# Patient Record
Sex: FEMALE | Race: WHITE
[De-identification: names, ages, dates, MRNs, and addresses within clinical notes are randomized per-mention and may not be internally consistent; named-entity substitution may affect disease eponyms.]

---

## 2018-04-13 ENCOUNTER — HOSPITAL ENCOUNTER (EMERGENCY)
Dept: HOSPITAL 97 - ER | Age: 33
Discharge: HOME | End: 2018-04-13
Payer: SELF-PAY

## 2018-04-13 DIAGNOSIS — F41.9: ICD-10-CM

## 2018-04-13 DIAGNOSIS — Z91.09: ICD-10-CM

## 2018-04-13 DIAGNOSIS — R55: Primary | ICD-10-CM

## 2018-04-13 LAB
BUN BLD-MCNC: 18 MG/DL (ref 6–20)
GLUCOSE SERPLBLD-MCNC: 92 MG/DL (ref 65–120)
HCT VFR BLD CALC: 42.9 % (ref 36–45)
LYMPHOCYTES # SPEC AUTO: 3.8 K/UL (ref 0.7–4.9)
MCH RBC QN AUTO: 28.2 PG (ref 27–35)
MCV RBC: 88.4 FL (ref 80–100)
PMV BLD: 8.4 FL (ref 7.6–11.3)
POTASSIUM SERPL-SCNC: 3.9 MEQ/L (ref 3.6–5)
RBC # BLD: 4.86 M/UL (ref 3.86–4.86)

## 2018-04-13 PROCEDURE — 81003 URINALYSIS AUTO W/O SCOPE: CPT

## 2018-04-13 PROCEDURE — 70450 CT HEAD/BRAIN W/O DYE: CPT

## 2018-04-13 PROCEDURE — 99284 EMERGENCY DEPT VISIT MOD MDM: CPT

## 2018-04-13 PROCEDURE — 96360 HYDRATION IV INFUSION INIT: CPT

## 2018-04-13 PROCEDURE — 36415 COLL VENOUS BLD VENIPUNCTURE: CPT

## 2018-04-13 PROCEDURE — 85025 COMPLETE CBC W/AUTO DIFF WBC: CPT

## 2018-04-13 PROCEDURE — 93005 ELECTROCARDIOGRAM TRACING: CPT

## 2018-04-13 PROCEDURE — 81025 URINE PREGNANCY TEST: CPT

## 2018-04-13 PROCEDURE — 80048 BASIC METABOLIC PNL TOTAL CA: CPT

## 2018-04-13 NOTE — EDPHYS
Physician Documentation                                                                           

 [facility]                                                                

Name: Linh Urban                                                                                

Age: 32 yrs                                                                                       

Sex: Female                                                                                       

: 1985                                                                                   

MRN: [id number]                                                                                   

Arrival Date: 2018                                                                          

Time: 15:12                                                                                       

Account#: [id number]                                                                            

Bed 30                                                                                            

Private MD:                                                                                       

ED Physician Tod Lagunas                                                                      

HPI:                                                                                              

                                                                                             

15:34 This 32 yrs old  Female presents to ER via EMS with complaints of Syncope.     jr8 

15:34 The patient has experienced syncope. Onset: The symptoms/episode began/occurred         jr8 

      acutely, today. Duration: This was a single episode, that lasted an unknown period of       

      time. Context: occurred homeless shelter , occurred while the patient was standing,         

      Just prior to the episode the patient experienced dizziness. Associated injury: The         

      patient did not suffer any apparent associated injury. Associated signs and symptoms:       

      The patient has no apparent associated signs or symptoms. Current symptoms: headache,       

      that is mild. The patient has not experienced similar symptoms in the past. The patient     

      has not recently seen a physician. Patient stated that she had been packing all day.        

      Started to feel dizzy. Had "chugged" a cold bottle of water then soon after had             

      syncopal episode .                                                                          

                                                                                                  

OB/GYN:                                                                                           

15:28 LMP 4/10/2018                                                                           rk2 

                                                                                                  

Historical:                                                                                       

- Allergies:                                                                                      

15:19 Iodine;                                                                                 iw  

- Home Meds:                                                                                      

15:19 Alprazolam Oral [Active];                                                               iw  

- PMHx:                                                                                           

15:19 Depression; Hypertension; Ovarian cyst; Anxiety;                                        iw  

- PSHx:                                                                                           

15:19 None;                                                                                   iw  

                                                                                                  

- Immunization history:: Pneumococcal vaccine is not up to date, Flu vaccine is not up            

  to date.                                                                                        

- Social history:: Smoking status: Patient/guardian denies using tobacco, the patient             

  reports quitting approximately .5 years ago.                                                    

                                                                                                  

                                                                                                  

ROS:                                                                                              

15:34 Eyes: Negative for injury, pain, redness, and discharge, ENT: Negative for injury,      jr8 

      pain, and discharge, Neck: Negative for injury, pain, and swelling, Cardiovascular:         

      Negative for chest pain, palpitations, and edema, Respiratory: Negative for shortness       

      of breath, cough, wheezing, and pleuritic chest pain, Abdomen/GI: Negative for              

      abdominal pain, vomiting, diarrhea, and constipation. Positive for nausea  Back:            

      Negative for injury and pain, MS/Extremity: Negative for injury and deformity, Skin:        

      Negative for injury, rash, and discoloration.                                               

15:34 Neuro: Positive for dizziness, loss of consciousness, syncope.                              

                                                                                                  

Exam:                                                                                             

15:34 Eyes:  Pupils equal round and reactive to light, extra-ocular motions intact.  Lids and jr8 

      lashes normal.  Conjunctiva and sclera are non-icteric and not injected.  Cornea within     

      normal limits.  Periorbital areas with no swelling, redness, or edema. ENT:  Nares          

      patent. No nasal discharge, no septal abnormalities noted.  Tympanic membranes are          

      normal and external auditory canals are clear.  Oropharynx with no redness, swelling,       

      or masses, exudates, or evidence of obstruction, uvula midline.  Mucous membranes           

      moist. Neck:  Trachea midline, no thyromegaly or masses palpated, and no cervical           

      lymphadenopathy.  Supple, full range of motion without nuchal rigidity, or vertebral        

      point tenderness.  No Meningismus. Cardiovascular:  Regular rate and rhythm with a          

      normal S1 and S2.  No gallops, murmurs, or rubs.  Normal PMI, no JVD.  No pulse             

      deficits. Respiratory:  Lungs have equal breath sounds bilaterally, clear to                

      auscultation and percussion.  No rales, rhonchi or wheezes noted.  No increased work of     

      breathing, no retractions or nasal flaring. Abdomen/GI:  Soft, non-tender, with normal      

      bowel sounds.  No distension or tympany.  No guarding or rebound.  No evidence of           

      tenderness throughout. Back:  No spinal tenderness.  No costovertebral tenderness.          

      Full range of motion. Skin:  Warm, dry with normal turgor.  Normal color with no            

      rashes, no lesions, and no evidence of cellulitis. MS/ Extremity:  Pulses equal, no         

      cyanosis.  Neurovascular intact.  Full, normal range of motion. Neuro:  Awake and           

      alert, GCS 15, oriented to person, place, time, and situation.  Cranial nerves II-XII       

      grossly intact.  Motor strength 5/5 in all extremities.  Sensory grossly intact.            

      Cerebellar exam normal.  Normal gait.                                                       

                                                                                                  

Vital Signs:                                                                                      

15:19  / 91; Pulse 86; Resp 16; Pulse Ox 97% on R/A;                                    iw  

17:17  / 66 Supine; Pulse 77;                                                           rk2 

17:17  / 77 Sitting; Pulse 74;                                                          rk2 

17:17  / 78; Pulse 76;                                                                  rk2 

18:41  / 82; Pulse 67; Resp 17; Pulse Ox 97% on R/A;                                    rk2 

                                                                                                  

MDM:                                                                                              

15:13 Patient medically screened.                                                             jr8 

17:03 Data reviewed: vital signs, nurses notes, lab test result(s), EKG, radiologic studies,  [facility] 

      CT scan, and as a result, I will discharge patient. Data interpreted: Pulse oximetry:       

      on room air is 97 %. Interpretation: normal. Counseling: I had a detailed discussion        

      with the patient and/or guardian regarding: the historical points, exam findings, and       

      any diagnostic results supporting the discharge/admit diagnosis, lab results, radiology     

      results, the need for outpatient follow up, a family practitioner, to return to the         

      emergency department if symptoms worsen or persist or if there are any questions or         

      concerns that arise at home.                                                                

                                                                                                  

                                                                                             

15:32 Order name: CBC with Diff; Complete Time: 16:29                                         [facility] 

                                                                                             

15:32 Order name: Basic Metabolic Panel; Complete Time: 17:02                                 [facility] 

                                                                                             

15:32 Order name: CT Head Brain wo Cont; Complete Time: 16:21                                 [facility] 

                                                                                             

16:19 Order name: Urine Dipstick--Ancillary (enter results); Complete Time: 18:19             [facility] 

                                                                                             

16:19 Order name: Urine Pregnancy--Ancillary (enter results); Complete Time: 18:19            [facility] 

                                                                                             

15:32 Order name: Urine Pregnancy Test (obtain specimen); Complete Time: 15:57                [facility] 

                                                                                             

15:32 Order name: Urine Dipstick-Ancillary (obtain specimen); Complete Time: 15:57            [facility] 

                                                                                             

15:32 Order name: EKG - Nurse/Tech; Complete Time: 16:04                                      [facility] 

                                                                                             

15:32 Order name: EKG; Complete Time: 15:33                                                   [facility] 

                                                                                             

16:25 Order name: Labs - recollect needed; Complete Time: 16:33                               [facility] 

                                                                                             

17:04 Order name: Orthostatics; Complete Time: 17:19                                          [facility] 

                                                                                                  

Administered Medications:                                                                         

17:35 Drug: NS 0.9% 1000 ml Route: IV; Rate: 1 bolus; Site: right femoral;                    rk2 

19:00 Follow up: Response: No adverse reaction; IV Status: Completed infusion                 rk2 

                                                                                                  

                                                                                                  

Point of Care Testing:                                                                            

15:28 88 Per EMS                                                                              rk2 

      Ranges:                                                                                     

      Critical Glucose Levels:Adult <50 mg/dl or >400 mg/dl  <40 mg/dl or >180 mg/dl       

Disposition:                                                                                      

18 17:03 Discharged to Home. Impression: Syncope and collapse.                              

- Condition is Stable.                                                                            

- Discharge Instructions: Syncope.                                                                

                                                                                                  

- Medication Reconciliation Form, Thank You Letter, Antibiotic Education, Prescription            

  Opioid Use form.                                                                                

- Follow up: Private Physician; When: 2 - 3 days; Reason: Recheck today's complaints,             

  Continuance of care, Re-evaluation by your physician.                                           

- Problem is new.                                                                                 

- Symptoms have improved.                                                                         

                                                                                                  

                                                                                                  

                                                                                                  

Addendum:                                                                                         

2018                                                                                        

     07:57 Co-signature as Attending Physician, Tod Lagunas MD I agree with the assessment and  w
a

           plan of care.                                                                          

                                                                                                  

Signatures:                                                                                       

Dispatcher [employer]                           EDJayshree Loco, RN                     RN   iw                                                   

Dwayne Cabrera PA PA   jr8                                                  

Tod Lagunas MD MD wa Kidder, Rhonda RN                      RN   rk2                                                  

Teresa Daily                            mw2                                                  

                                                                                                  

**************************************************************************************************

## 2018-04-13 NOTE — ER
Nurse's Notes                                                                                     

 [facility]                                                                

Name: Linh Urban                                                                                

Age: 32 yrs                                                                                       

Sex: Female                                                                                       

: 1985                                                                                   

MRN: [id number]                                                                                   

Arrival Date: 2018                                                                          

Time: 15:12                                                                                       

Account#: [id number]                                                                            

Bed 30                                                                                            

Private MD:                                                                                       

Diagnosis: Syncope and collapse                                                                   

                                                                                                  

Presentation:                                                                                     

                                                                                             

15:15 Presenting complaint: Patient states: was cleaning and packing up at shelter, has been  iw  

      feeling dizzy and nauseous all day, was reaching up on top bunk and had syncopal            

      episode, woke up beside the baby's playpen, pt has had previous syncopal episode due to     

      dehydration, has hx of anxiety and depression, has been out of anxiety medication.          

      Transition of care: patient was not received from another setting of care. Onset of         

      symptoms was 2018. Care prior to arrival: Glucose check: 88.                      

15:15 Method Of Arrival: EMS: [facility]                                                    iw  

15:15 Acuity: MATHEW 3                                                                           iw  

                                                                                                  

Triage Assessment:                                                                                

15:28 General: Appears in no apparent distress. Behavior is calm, cooperative. Pain:. Neuro:  rk2 

      Level of Consciousness is alert, obeys commands, Oriented to person, place, time,           

      situation. Neuro: Reports a syncopal episode. Cardiovascular: Rhythm is sinus rhythm.       

      Respiratory: Airway is patent Respiratory effort is even, unlabored, Respiratory            

      pattern is regular, symmetrical. Derm: Skin is pink, warm \T\ dry.                          

                                                                                                  

OB/GYN:                                                                                           

15:28 LMP 4/10/2018                                                                           rk2 

                                                                                                  

Historical:                                                                                       

- Allergies:                                                                                      

15:19 Iodine;                                                                                 iw  

- Home Meds:                                                                                      

15:19 Alprazolam Oral [Active];                                                               iw  

- PMHx:                                                                                           

15:19 Depression; Hypertension; Ovarian cyst; Anxiety;                                        iw  

- PSHx:                                                                                           

15:19 None;                                                                                   iw  

                                                                                                  

- Immunization history:: Pneumococcal vaccine is not up to date, Flu vaccine is not up            

  to date.                                                                                        

- Social history:: Smoking status: Patient/guardian denies using tobacco, the patient             

  reports quitting approximately .5 years ago.                                                    

                                                                                                  

                                                                                                  

Screening:                                                                                        

15:27 Abuse screen: Denies threats or abuse. Nutritional screening: No deficits noted.        rk2 

      Tuberculosis screening: No symptoms or risk factors identified. Fall Risk Fall in past      

      12 months (25 points).                                                                      

                                                                                                  

Assessment:                                                                                       

15:31 Neuro: Level of Consciousness is alert, obeys commands, Oriented to person, place,      rk2 

      time, situation. Cardiovascular: Rhythm is sinus rhythm.                                    

16:30 Reassessment: Pt. resting in room, appears to be in no obvious distress \T\ this time. No rk2 

      needs voiced.                                                                               

17:20 Reassessment: Ortho vitals completed, pt. feels dizzy while standing... provider        rk2 

      notified. NS bolus ordered before DC.                                                       

19:06 Reassessment: Pt. fluids completed... feels better after fluids. Iv removed and pt. was rk2 

      able to ambulate out on her own without difficulty.                                         

                                                                                                  

Vital Signs:                                                                                      

15:19  / 91; Pulse 86; Resp 16; Pulse Ox 97% on R/A;                                    iw  

17:17  / 66 Supine; Pulse 77;                                                           rk2 

17:17  / 77 Sitting; Pulse 74;                                                          rk2 

17:17  / 78; Pulse 76;                                                                  rk2 

18:41  / 82; Pulse 67; Resp 17; Pulse Ox 97% on R/A;                                    rk2 

                                                                                                  

ED Course:                                                                                        

15:12 Patient arrived in ED.                                                                  iw  

15:13 Dwayne Cabrera PA is PHCP.                                                               jr8 

15:13 Tod Lagunas MD is Attending Physician.                                             jr8 

15:18 Triage completed.                                                                       iw  

15:19 Arm band placed on.                                                                     iw  

15:20 Merle Montgomery RN is Primary Nurse.                                                    rk2 

15:27 Patient has correct armband on for positive identification. Placed in gown. Bed in low  rk2 

      position. Call light in reach. Side rails up X2. Cardiac monitor on. Pulse ox on.           

15:57 CT Head Brain wo Cont In Process Unspecified.                                           EDMS

17:01 EKG done, by EKG tech. reviewed by Dwayne SEARS.                                      at1 

19:08 No provider procedures requiring assistance completed. IV discontinued.                 rk2 

                                                                                                  

Administered Medications:                                                                         

17:35 Drug: NS 0.9% 1000 ml Route: IV; Rate: 1 bolus; Site: right femoral;                    rk2 

19:00 Follow up: Response: No adverse reaction; IV Status: Completed infusion                 rk2 

                                                                                                  

                                                                                                  

Point of Care Testing:                                                                            

15:28 88 Per EMS                                                                              rk2 

      Ranges:                                                                                     

                                                                                                  

Outcome:                                                                                          

17:03 Discharge ordered by MD.                                                                jr8 

19:08 Discharged to home ambulatory.                                                          rk2 

19:08 Condition: good                                                                             

19:08 Discharge instructions given to patient.                                                    

19:09 Patient left the ED.                                                                    rk2 

                                                                                                  

Signatures:                                                                                       

Dispatcher [employer]                           EDMS                                                 

Jayshree Lawrence, RN                     RN   iw                                                   

Dwayne Cabrera PA                        PA   jr8                                                  

Capri amaya, EKG Tech              EKG Tat1                                                  

Merle Montgomery, RN                      RN   rk2                                                  

                                                                                                  

**************************************************************************************************

## 2018-04-13 NOTE — RAD REPORT
EXAM DESCRIPTION:  CT - Head Brain Wo Cont - 4/13/2018 3:57 pm

 

CLINICAL HISTORY:  Dizziness, syncope, head trauma

 

COMPARISON:  None.

 

TECHNIQUE:  Axial 5 mm thick images of the head were obtained without IV contrast.

 

All CT scans are performed using dose optimization technique as appropriate and may include automated
 exposure control or mA/KV adjustment according to patient size.

 

FINDINGS:  No intracranial hemorrhage, mass, edema or shift of mid-line structures. No acute infarcti
on changes seen. No abnormal extra-axial fluid collections. Asymmetry of the ventricles noted as a no
rmal variant. Physiologic calcifications are present.

 

Mastoid air cells and visualized portions of the paranasal sinuses are clear.

 

No acute bony findings.

 

 

IMPRESSION:  Negative non-contrast CT head examination.

## 2018-04-15 NOTE — EKG
Test Date:    2018-04-13               Test Time:    16:43:33

Technician:   CEZAR                                     

                                                     

MEASUREMENT RESULTS:                                       

Intervals:                                           

Rate:         77                                     

VA:           154                                    

QRSD:         76                                     

QT:           376                                    

QTc:          425                                    

Axis:                                                

P:            22                                     

VA:           154                                    

QRS:          11                                     

T:            17                                     

                                                     

INTERPRETIVE STATEMENTS:                                       

                                                     

Normal sinus rhythm with sinus arrhythmia

Normal ECG

No previous ECG available for comparison



Electronically Signed On 04-15-18 22:40:57 CDT by Vern Brown